# Patient Record
Sex: MALE | Race: WHITE | NOT HISPANIC OR LATINO | Employment: FULL TIME | URBAN - METROPOLITAN AREA
[De-identification: names, ages, dates, MRNs, and addresses within clinical notes are randomized per-mention and may not be internally consistent; named-entity substitution may affect disease eponyms.]

---

## 2021-03-10 DIAGNOSIS — Z23 ENCOUNTER FOR IMMUNIZATION: ICD-10-CM

## 2022-03-21 ENCOUNTER — EVALUATION (OUTPATIENT)
Dept: PHYSICAL THERAPY | Facility: REHABILITATION | Age: 62
End: 2022-03-21
Payer: COMMERCIAL

## 2022-03-21 DIAGNOSIS — S93.401D SPRAIN OF UNSPECIFIED LIGAMENT OF RIGHT ANKLE, SUBSEQUENT ENCOUNTER: Primary | ICD-10-CM

## 2022-03-21 PROCEDURE — 97161 PT EVAL LOW COMPLEX 20 MIN: CPT

## 2022-03-21 NOTE — LETTER
2022    Nuno Perrin DPM  [facility]    Patient: Andre Benjamin   YOB: 1960   Date of Visit: 3/21/2022     Encounter Diagnosis     ICD-10-CM    1  Sprain of unspecified ligament of right ankle, subsequent encounter  S93 401D        Dear Dr Destiny Allen: Thank you for your recent referral of Andre Benjamin  Please review the attached evaluation summary from [address] [place] [address] recent visit  Please verify that you agree with the plan of care by signing the attached order  If you have any questions or concerns, please do not hesitate to call  I sincerely appreciate the opportunity to share in the care of one of your patients and hope to have another opportunity to work with you in the near future  Sincerely,    Scott Crews, PT      Referring Provider:      I certify that I have read the below Plan of Care and certify the need for these services furnished under this plan of treatment while under my care  Nuno Perrin DPM  [address] [facility] [facility] [address]  Via Fax: [phone]          PT Evaluation     Today's date: 3/21/2022  Patient name: Andre Benjamin  : 1960  MRN: [id number]  Referring provider: Margarito Rubio DPM  Dx:   Encounter Diagnosis     ICD-10-CM    1  Sprain of unspecified ligament of right ankle, subsequent encounter  S93 401D                   Assessment  Assessment details: Pt, Andre Benjamin, is a 63 y o  male presenting to physical therapy on this date for an initial evaluation  Upon eval on this date, pt presented with minimally decreased R ankle ROM, decreased R ankle inversion and eversion strength, and overall impaired tolerance to functional activities  At this time, pt would benefit from skilled OP PT to work on deficits listed above and improve overall functional mobility with decreased reports of pain and compensation patterns   Thank you for this referral    Impairments: abnormal gait, activity intolerance, impaired balance, impaired physical strength, lacks appropriate home exercise program and pain with function    Goals  STG:    Pt will demonstrate 25% improvements in ROM in 4 weeks   Pt will be I with initial HEP to progress towards independence with exercise     LTG:   Pt will be I with IADLs without pain or restriction by d/c   Pt will improve R ankle ROM to [facility] by d/c for increased ease and safety with ADLs and overall functional mobility   Pt will improve R ankle strength by d/c for increased ease and safety with functional mobility and activities   Pt will be I with modified/updated HEP and demonstrate ability to complete with confidence and proper mechanics/form to safely be d/c from skilled OP PT and continue with exercises on their own     Plan  Patient would benefit from: PT eval and skilled physical therapy  Planned modality interventions: cryotherapy  Planned therapy interventions: balance/weight bearing training, body mechanics training, functional ROM exercises, home exercise program, therapeutic exercise, therapeutic activities, stretching, strengthening, patient education, neuromuscular re-education, manual therapy, joint mobilization and IADL retraining  Frequency: 2x week  Duration in visits: 8  Duration in weeks: 4  Treatment plan discussed with: patient        Subjective Evaluation    History of Present Illness  Date of onset: 3/5/2022  Mechanism of injury: Pt reports that he twisted his ankle March 5th  Pt reports that he was initially in a boot for one week and he has been wearing a brace since  Pt reports that he does not have any pain in his ankle or any n/t in his leg  Pt reports that prior to this injury he has no hx of injuries to his ankle  Pt reports that he does not work currently but he works at [employer] and is scheduled to return April 18th  He reports no issues with ADLs and no issues sleeping because of his ankle  Pt reports that he is no longer taking medications for pain  Pain  Current pain ratin  At worst pain ratin  Quality: dull ache, tight and discomfort  Relieving factors: ice and medications  Exacerbated by: turning the ankle   Progression: improved    Treatments  Previous treatment: immobilization  Patient Goals  Patient goals for therapy: decreased pain, improved balance, increased motion, increased strength, independence with ADLs/IADLs and return to sport/leisure activities  Patient goal: be able to walk without the brace         Objective     Active Range of Motion     Right Ankle/Foot   Dorsiflexion (ke): 10 degrees   Plantar flexion: 25 degrees   Inversion: 25 degrees   Eversion: 5 degrees     Passive Range of Motion     Right Ankle/Foot    Dorsiflexion (ke): 12 degrees   Plantar flexion: 35 degrees   Inversion: 30 degrees   Eversion: 10 degrees     Strength/Myotome Testing     Right Ankle/Foot   Dorsiflexion: 5  Plantar flexion: 5  Inversion: 4+  Eversion: 4+             Precautions: N/A      3/21            FOTO Perf            IE/RE IE              Manuals                                                                 Neuro Re-Ed             SLS             Tandem Stance             Biodex LOS, catch             Obstacle Course                                                    Ther Ex             Bike              Wall sits              HR             Step ups              Seated HS stretch             Standing gastroc stretch             Leg Press                                                                 Gait Training                                       Modalities

## 2022-03-21 NOTE — PROGRESS NOTES
PT Evaluation     Today's date: 3/21/2022  Patient name: Andre Benjamin  : 1960  MRN: [id number]  Referring provider: Chely Littlejohn DPM  Dx:   Encounter Diagnosis     ICD-10-CM    1  Sprain of unspecified ligament of right ankle, subsequent encounter  S93 401D                   Assessment  Assessment details: Pt, Andre Benjamin, is a 63 y o  male presenting to physical therapy on this date for an initial evaluation  Upon eval on this date, pt presented with minimally decreased R ankle ROM, decreased R ankle inversion and eversion strength, and overall impaired tolerance to functional activities  At this time, pt would benefit from skilled OP PT to work on deficits listed above and improve overall functional mobility with decreased reports of pain and compensation patterns   Thank you for this referral    Impairments: abnormal gait, activity intolerance, impaired balance, impaired physical strength, lacks appropriate home exercise program and pain with function    Goals  STG:    Pt will demonstrate 25% improvements in ROM in 4 weeks   Pt will be I with initial HEP to progress towards independence with exercise     LTG:   Pt will be I with IADLs without pain or restriction by d/c   Pt will improve R ankle ROM to [facility] by d/c for increased ease and safety with ADLs and overall functional mobility   Pt will improve R ankle strength by d/c for increased ease and safety with functional mobility and activities   Pt will be I with modified/updated HEP and demonstrate ability to complete with confidence and proper mechanics/form to safely be d/c from skilled OP PT and continue with exercises on their own     Plan  Patient would benefit from: PT eval and skilled physical therapy  Planned modality interventions: cryotherapy  Planned therapy interventions: balance/weight bearing training, body mechanics training, functional ROM exercises, home exercise program, therapeutic exercise, therapeutic activities, stretching, strengthening, patient education, neuromuscular re-education, manual therapy, joint mobilization and IADL retraining  Frequency: 2x week  Duration in visits: 8  Duration in weeks: 4  Treatment plan discussed with: patient        Subjective Evaluation    History of Present Illness  Date of onset: 3/5/2022  Mechanism of injury: Pt reports that he twisted his ankle   Pt reports that he was initially in a boot for one week and he has been wearing a brace since  Pt reports that he does not have any pain in his ankle or any n/t in his leg  Pt reports that prior to this injury he has no hx of injuries to his ankle  Pt reports that he does not work currently but he works at [employer] and is scheduled to return   He reports no issues with ADLs and no issues sleeping because of his ankle  Pt reports that he is no longer taking medications for pain     Pain  Current pain ratin  At worst pain ratin  Quality: dull ache, tight and discomfort  Relieving factors: ice and medications  Exacerbated by: turning the ankle   Progression: improved    Treatments  Previous treatment: immobilization  Patient Goals  Patient goals for therapy: decreased pain, improved balance, increased motion, increased strength, independence with ADLs/IADLs and return to sport/leisure activities  Patient goal: be able to walk without the brace         Objective     Active Range of Motion     Right Ankle/Foot   Dorsiflexion (ke): 10 degrees   Plantar flexion: 25 degrees   Inversion: 25 degrees   Eversion: 5 degrees     Passive Range of Motion     Right Ankle/Foot    Dorsiflexion (ke): 12 degrees   Plantar flexion: 35 degrees   Inversion: 30 degrees   Eversion: 10 degrees     Strength/Myotome Testing     Right Ankle/Foot   Dorsiflexion: 5  Plantar flexion: 5  Inversion: 4+  Eversion: 4+             Precautions: N/A      3/21            FOTO Perf            IE/RE IE              Manuals Neuro Re-Ed             SLS             Tandem Stance             Biodex LOS, catch             Obstacle Course                                                    Ther Ex             Bike              Wall sits              HR             Step ups              Seated HS stretch             Standing gastroc stretch             Leg Press                                                                 Gait Training                                       Modalities

## 2022-03-24 ENCOUNTER — OFFICE VISIT (OUTPATIENT)
Dept: PHYSICAL THERAPY | Facility: REHABILITATION | Age: 62
End: 2022-03-24
Payer: COMMERCIAL

## 2022-03-24 DIAGNOSIS — S93.401D SPRAIN OF UNSPECIFIED LIGAMENT OF RIGHT ANKLE, SUBSEQUENT ENCOUNTER: Primary | ICD-10-CM

## 2022-03-24 PROCEDURE — 97110 THERAPEUTIC EXERCISES: CPT

## 2022-03-24 PROCEDURE — 97112 NEUROMUSCULAR REEDUCATION: CPT

## 2022-03-24 NOTE — PROGRESS NOTES
Daily Note     Today's date: 3/24/2022  Patient name: Andre Benjamin  : 1960  MRN: [id number]  Referring provider: Margarito Rubio DPM  Dx:   Encounter Diagnosis     ICD-10-CM    1  Sprain of unspecified ligament of right ankle, subsequent encounter  S93 401D                   Subjective: Pt reports that his ankle is feeling good and he does not have any pain in it  Objective: See treatment diary below      Assessment: Pt tolerated treatment well  Patient completed his first follow up since his IE on this date with min cueing required throughout for form and mechanics with good carryover after instruction  Pt completed all exercises without brace on to promote increased ankle stability and strength during exercises  Pt was educated to complete all exercises to his tolerance and if he experienced any pain or discomfort to inform therapist immediately, pt verbalized understanding  Plan: Continue per plan of care  Progress treatment as tolerated         Precautions: N/A      3/21 3/24           FOTO Perf            IE/RE IE              Manuals                                                                 Neuro Re-Ed             SLS  2x 30"             Tandem Stance  Tandem walk 2 c UE support, 4 w/o UE support           Biodex LOS, catch  LOS x3, random x1           Obstacle Course               Rhomberg stance 2x30" on foam             Rockerboard x20 ea                        Ther Ex             Bike   x5 min           Wall sits   5" 2x10           HR             Step ups   Lat 0R x10           Seated HS stretch             Standing gastroc stretch             Leg Press   # 2x10             SL 44# 2x10                                                  Gait Training                                       Modalities

## 2022-03-28 ENCOUNTER — OFFICE VISIT (OUTPATIENT)
Dept: PHYSICAL THERAPY | Facility: REHABILITATION | Age: 62
End: 2022-03-28
Payer: COMMERCIAL

## 2022-03-28 DIAGNOSIS — S93.401D SPRAIN OF UNSPECIFIED LIGAMENT OF RIGHT ANKLE, SUBSEQUENT ENCOUNTER: Primary | ICD-10-CM

## 2022-03-28 PROCEDURE — 97110 THERAPEUTIC EXERCISES: CPT

## 2022-03-28 PROCEDURE — 97112 NEUROMUSCULAR REEDUCATION: CPT

## 2022-03-28 NOTE — PROGRESS NOTES
Daily Note     Today's date: 3/28/2022  Patient name: Andre Benjamin  : 1960  MRN: [id number]  Referring provider: Gerardo Kay DPM  Dx:   Encounter Diagnosis     ICD-10-CM    1  Sprain of unspecified ligament of right ankle, subsequent encounter  S93 401D                   Subjective: Pt reports that his ankle is feeling good  He reports that he has been doing his exercises at home and today he arrives to therapy not wearing his ankle brace  Objective: See treatment diary below      Assessment: Pt tolerated treatment well  Pt was able to complete all exercises without issue and was able to tolerate progression of balance exercises well  Patient demonstrated fatigue post treatment, exhibited good technique with therapeutic exercises and would benefit from continued PT  Plan: Continue per plan of care  Progress treatment as tolerated         Precautions: N/A      3/21 3/24 3/28          FOTO Perf            IE/RE IE              Manuals                          Neuro Re-Ed             SLS  2x 30"   2x30" on foam          Tandem Stance  Tandem walk 2 c UE support, 4 w/o UE support Tandem walk with no UE support 4 laps           Biodex LOS, catch  LOS x3, random x1 LOS x3, random x1          Obstacle Course   Stand on bosu 2x1 min            Rhomberg stance 2x30" on foam Rhomberg stance 2x30" on foam            Rockerboard x20 ea Rocker board x20 ea             Tandem stance on blue/green foam ball toss x15 ea           Ther Ex             Bike   TM x5 min TM x5 min          Wall sits   5" 2x10 5" 2x10          HR   x20 on foam          Step ups   Lat 0R x10 Lat 1R x10          Seated HS stretch             Standing gastroc stretch             Leg Press   # 2x10 # 2x10            SL 44# 2x10 SL 88# 2x10                       Modalities

## 2022-03-31 ENCOUNTER — OFFICE VISIT (OUTPATIENT)
Dept: PHYSICAL THERAPY | Facility: REHABILITATION | Age: 62
End: 2022-03-31
Payer: COMMERCIAL

## 2022-03-31 DIAGNOSIS — S93.401D SPRAIN OF UNSPECIFIED LIGAMENT OF RIGHT ANKLE, SUBSEQUENT ENCOUNTER: Primary | ICD-10-CM

## 2022-03-31 PROCEDURE — 97112 NEUROMUSCULAR REEDUCATION: CPT

## 2022-03-31 PROCEDURE — 97110 THERAPEUTIC EXERCISES: CPT

## 2022-03-31 NOTE — PROGRESS NOTES
Daily Note     Today's date: 3/31/2022  Patient name: Andre Benjamin  : 1960  MRN: [id number]  Referring provider: Naseem Blanca DPM  Dx:   Encounter Diagnosis     ICD-10-CM    1  Sprain of unspecified ligament of right ankle, subsequent encounter  S93 086D                   Subjective: Pt reports that his ankle is feeling good  He notes no complaints and states that he has been working on his leg press at home  Objective: See treatment diary below      Assessment: Pt tolerated treatment well  Pt required min cueing when walking on treadmill to make sure he was clearing his foot with each step and not dragging his toes  Pt continues to be most challenge with balance based exercises  Patient demonstrated fatigue post treatment, exhibited good technique with therapeutic exercises and would benefit from continued PT  Plan: Continue per plan of care  Progress treatment as tolerated         Precautions: N/A      3/21 3/24 3/28 3/31         FOTO Perf            IE/RE IE              Manuals                          Neuro Re-Ed             SLS  2x 30"   2x30" on foam 2x30" on foam         Tandem Stance  Tandem walk 2 c UE support, 4 w/o UE support Tandem walk with no UE support 4 laps  Tandem walk with no UE support 4 laps         Biodex LOS, catch  LOS x3, random x1 LOS x3, random x1 LOS L11 x2, Random L11 x1         Obstacle Course   Stand on bosu 2x1 min Stand on bosu x1 min           Rhomberg stance 2x30" on foam Rhomberg stance 2x30" on foam Mini squats on bosu x10           Rockerboard x20 ea Rocker board x20 ea Rocker board x20 ea            Tandem stance on blue/green foam ball toss x15 ea  Tandem stance on blue/green foam ball toss x15 ea          Ther Ex             Bike   TM x5 min TM x5 min TM x5 min         Wall sits   5" 2x10 5" 2x10 5" 2x10         HR   x20 on foam Slow march with pause 20'x2         Step ups   Lat 0R x10 Lat 1R x10 Lat 1R x15         Seated HS stretch    Side step on foam x4 laps         Standing gastroc stretch             Leg Press   # 2x10 # 2x10 DL #176 2x15           SL 44# 2x10 SL 88# 2x10 SL 88# 2x15                      Modalities

## 2022-04-04 ENCOUNTER — OFFICE VISIT (OUTPATIENT)
Dept: PHYSICAL THERAPY | Facility: REHABILITATION | Age: 62
End: 2022-04-04
Payer: COMMERCIAL

## 2022-04-04 DIAGNOSIS — S93.401D SPRAIN OF UNSPECIFIED LIGAMENT OF RIGHT ANKLE, SUBSEQUENT ENCOUNTER: Primary | ICD-10-CM

## 2022-04-04 PROCEDURE — 97112 NEUROMUSCULAR REEDUCATION: CPT

## 2022-04-04 PROCEDURE — 97110 THERAPEUTIC EXERCISES: CPT

## 2022-04-04 NOTE — PROGRESS NOTES
Daily Note     Today's date: 2022  Patient name: Andre Benjamin  : 1960  MRN: [id number]  Referring provider: Rufina Bettencourt DPM  Dx:   Encounter Diagnosis     ICD-10-CM    1  Sprain of unspecified ligament of right ankle, subsequent encounter  S93 215D                   Subjective: Pt reports that his ankle is feeling good  He reports he went fishing with his son this past weekend and there was one moment where his ankle turned and he noticed it but he did not have pain with it  Objective: See treatment diary below      Assessment: Pt tolerated treatment well  Pt was challenged with progression to ball throw while completing SLS with significant body sway noted as compensation to maintain balance  Patient demonstrated fatigue post treatment, exhibited good technique with therapeutic exercises and would benefit from continued PT  Plan: Continue per plan of care  Progress treatment as tolerated         Precautions: N/A      3/21 3/24 3/28 3/31 4/4        FOTO Perf    NV        IE/RE IE              Manuals                          Neuro Re-Ed             SLS  2x 30"   2x30" on foam 2x30" on foam SLS on foam 2x10 ball throws        Tandem Stance  Tandem walk 2 c UE support, 4 w/o UE support Tandem walk with no UE support 4 laps  Tandem walk with no UE support 4 laps Tandem walk on foam 4 laps        Biodex LOS, catch  LOS x3, random x1 LOS x3, random x1 LOS L11 x2, Random L11 x1 LOS L10 x2, Random L10 x1        Obstacle Course   Stand on bosu 2x1 min Stand on bosu x1 min           Rhomberg stance 2x30" on foam Rhomberg stance 2x30" on foam Mini squats on bosu x10 Mini squats on bosu 2x10          Rockerboard x20 ea Rocker board x20 ea Rocker board x20 ea Rocker board x30 ea           Tandem stance on blue/green foam ball toss x15 ea  Tandem stance on blue/green foam ball toss x15 ea  Tandem stance on blue/green foam ball toss 2x15 ea         Ther Ex             Bike   TM x5 min TM x5 min TM x5 min TM x5 min        Wall sits   5" 2x10 5" 2x10 5" 2x10 D/C        HR   x20 on foam Slow march with pause 20'x2 Slow march with pause 20'x4        Step ups   Lat 0R x10 Lat 1R x10 Lat 1R x15         Seated HS stretch    Side step on foam x4 laps Side step on foam x4 laps        Standing gastroc stretch             Leg Press   # 2x10 # 2x10 DL #[id number] 2x15 DL #198 2x15          SL 44# 2x10 SL 88# 2x10 SL 88# 2x15 SL 88# 2x15                      Modalities

## 2022-04-07 ENCOUNTER — OFFICE VISIT (OUTPATIENT)
Dept: PHYSICAL THERAPY | Facility: REHABILITATION | Age: 62
End: 2022-04-07
Payer: COMMERCIAL

## 2022-04-07 DIAGNOSIS — S93.401D SPRAIN OF UNSPECIFIED LIGAMENT OF RIGHT ANKLE, SUBSEQUENT ENCOUNTER: Primary | ICD-10-CM

## 2022-04-07 PROCEDURE — 97110 THERAPEUTIC EXERCISES: CPT

## 2022-04-07 PROCEDURE — 97112 NEUROMUSCULAR REEDUCATION: CPT

## 2022-04-07 NOTE — PROGRESS NOTES
Daily Note     Today's date: 2022  Patient name: Andre Benjamin  : 1960  MRN: [id number]  Referring provider: Libia Lockwood DPM  Dx:   Encounter Diagnosis     ICD-10-CM    1  Sprain of unspecified ligament of right ankle, subsequent encounter  S93 401D                   Subjective: Pt reports that his ankle is doing well, notes no issues  He reports that he did try a bit of jogging yesterday with his dogs but his ankle just didn't feel right so he stopped  Objective: See treatment diary below      Assessment: Pt tolerated treatment well  Pt demonstrates most challenge with single limb stability exercises especially on uneven surfaces with observable postural sway to maintain balance as well as L foot tapping on firm ground to maintain balance  Pt was challenged with addition of airplanes on this date again demonstrating significant challenge with single limb stability and compensation patterns previously listed  Patient demonstrated fatigue post treatment, exhibited good technique with therapeutic exercises and would benefit from continued PT  Plan: Continue per plan of care  Progress treatment as tolerated         Precautions: N/A      3/21 3/24 3/28 3/31 4/4 4/7       FOTO Perf    NV        IE/RE IE              Manuals                          Neuro Re-Ed             SLS  2x 30"   2x30" on foam 2x30" on foam SLS on foam 2x10 ball throws SLS on foam 2x10 ball throws       Tandem Stance  Tandem walk 2 c UE support, 4 w/o UE support Tandem walk with no UE support 4 laps  Tandem walk with no UE support 4 laps Tandem walk on foam 4 laps        Biodex LOS, catch  LOS x3, random x1 LOS x3, random x1 LOS L11 x2, Random L11 x1 LOS L10 x2, Random L10 x1 LOS L10 x3 50%       Obstacle Course   Stand on bosu 2x1 min Stand on bosu x1 min           Rhomberg stance 2x30" on foam Rhomberg stance 2x30" on foam Mini squats on bosu x10 Mini squats on bosu 2x10 Mini squats on bosu 2x10         Rockerboard x20 ea Rocker board x20 ea Rocker board x20 ea Rocker board x30 ea Rocker board x30 ea          Tandem stance on blue/green foam ball toss x15 ea  Tandem stance on blue/green foam ball toss x15 ea  Tandem stance on blue/green foam ball toss 2x15 ea  Tandem stance on blue/green foam ball toss 2x15 ea        Ther Ex             Bike   TM x5 min TM x5 min TM x5 min TM x5 min TM x5 min       Wall sits   5" 2x10 5" 2x10 5" 2x10 D/C Airplanes x10 ea       HR   x20 on foam Slow march with pause 20'x2 Slow march with pause 20'x4 Slow march with pause 20'x4       Step ups   Lat 0R x10 Lat 1R x10 Lat 1R x15  Lat 1R x20       Seated HS stretch    Side step on foam x4 laps Side step on foam x4 laps        Standing gastroc stretch             Leg Press   # 2x10 # 2x10 DL #176 2x15 DL #198 2x15 DL #198 2x15         SL 44# 2x10 SL 88# 2x10 SL 88# 2x15 SL 88# 2x15  SL 88# 2x15                    Modalities

## 2022-04-11 ENCOUNTER — OFFICE VISIT (OUTPATIENT)
Dept: PHYSICAL THERAPY | Facility: REHABILITATION | Age: 62
End: 2022-04-11
Payer: COMMERCIAL

## 2022-04-11 DIAGNOSIS — S93.401D SPRAIN OF UNSPECIFIED LIGAMENT OF RIGHT ANKLE, SUBSEQUENT ENCOUNTER: Primary | ICD-10-CM

## 2022-04-11 PROCEDURE — 97112 NEUROMUSCULAR REEDUCATION: CPT

## 2022-04-11 PROCEDURE — 97110 THERAPEUTIC EXERCISES: CPT

## 2022-04-11 NOTE — PROGRESS NOTES
Daily Note     Today's date: 2022  Patient name: Andre Benjamin  : 1960  MRN: [id number]  Referring provider: Libia Lockwood DPM  Dx:   Encounter Diagnosis     ICD-10-CM    1  Sprain of unspecified ligament of right ankle, subsequent encounter  S93 772D                   Subjective: Pt reports that he is feeling good and had a follow up with his doctor this morning and is cleared to return to work next week  Objective: See treatment diary below      Assessment: Pt tolerated treatment well  Pt noted mild fatigue with increasingly difficult balance exercises but was able to complete all without reports of pain or discomfort in his ankle  Patient demonstrated fatigue post treatment, exhibited good technique with therapeutic exercises and would benefit from continued PT  Plan: RE to be completed NV        Precautions: N/A      3/21 3/24 3/28 3/31 4/4 4/7 4/11      FOTO Perf    NV        IE/RE IE              Manuals                          Neuro Re-Ed             SLS  2x 30"   2x30" on foam 2x30" on foam SLS on foam 2x10 ball throws SLS on foam 2x10 ball throws SLS on foam 2x15 ball throws      Tandem Stance  Tandem walk 2 c UE support, 4 w/o UE support Tandem walk with no UE support 4 laps  Tandem walk with no UE support 4 laps Tandem walk on foam 4 laps  Tandem walk on foam 4 laps      Biodex LOS, catch  LOS x3, random x1 LOS x3, random x1 LOS L11 x2, Random L11 x1 LOS L10 x2, Random L10 x1 LOS L10 x3 50% LOS L x3 29%      Obstacle Course   Stand on bosu 2x1 min Stand on bosu x1 min           Rhomberg stance 2x30" on foam Rhomberg stance 2x30" on foam Mini squats on bosu x10 Mini squats on bosu 2x10 Mini squats on bosu 2x10 Mini squats on bosu 2x15        Rockerboard x20 ea Rocker board x20 ea Rocker board x20 ea Rocker board x30 ea Rocker board x30 ea Rocker board x30 ea         Tandem stance on blue/green foam ball toss x15 ea  Tandem stance on blue/green foam ball toss x15 ea  Tandem stance on blue/green foam ball toss 2x15 ea  Tandem stance on blue/green foam ball toss 2x15 ea  Tandem stance on blue/green foam ball toss 2x15 ea      Ther Ex             Bike   TM x5 min TM x5 min TM x5 min TM x5 min TM x5 min TM x5 min      Wall sits   5" 2x10 5" 2x10 5" 2x10 D/C Airplanes x10 ea Airplanes x10 ea      HR   x20 on foam Slow march with pause 20'x2 Slow march with pause 20'x4 Slow march with pause 20'x4 Slow march with pause 20'x4      Step ups   Lat 0R x10 Lat 1R x10 Lat 1R x15  Lat 1R x20       Seated HS stretch    Side step on foam x4 laps Side step on foam x4 laps  Side step on foam x4 laps      Standing gastroc stretch             Leg Press   # 2x10 # 2x10 DL #176 2x15 DL #198 2x15 DL #198 2x15 DL #198 2x15        SL 44# 2x10 SL 88# 2x10 SL 88# 2x15 SL 88# 2x15  SL 88# 2x15 SL 88# 2x15                   Modalities

## 2022-04-14 ENCOUNTER — EVALUATION (OUTPATIENT)
Dept: PHYSICAL THERAPY | Facility: REHABILITATION | Age: 62
End: 2022-04-14
Payer: COMMERCIAL

## 2022-04-14 DIAGNOSIS — S93.401D SPRAIN OF UNSPECIFIED LIGAMENT OF RIGHT ANKLE, SUBSEQUENT ENCOUNTER: Primary | ICD-10-CM

## 2022-04-14 PROCEDURE — 97112 NEUROMUSCULAR REEDUCATION: CPT

## 2022-04-14 PROCEDURE — 97164 PT RE-EVAL EST PLAN CARE: CPT

## 2022-04-14 NOTE — PROGRESS NOTES
PT Re-evaluation     Today's date: 2022  Patient name: Andre Benjamin  : 1960  MRN: [id number]  Referring provider: Yamile Delgado DPM  Dx:   Encounter Diagnosis     ICD-10-CM    1  Sprain of unspecified ligament of right ankle, subsequent encounter  [id number]                 Assessment  Assessment details:   Upon RE on this date, pt had demonstrated improvements in both active and passive ROM as well as 5/5 R ankle strength  Since beginning therapy pt has been compliant with HEP and demonstrates overall good understanding of exercises  Pt has met all therapeutic goals  At this time, pt will be d/c to HEP  HEP was reviewed with pt and all questions were answered  Pt was educated to contact office with any questions or concerns, pt verbalized understanding  Goals  STG:    Pt will demonstrate 25% improvements in ROM in 4 weeks MET  Pt will be I with initial HEP to progress towards independence with exercise MET    LTG:   Pt will be I with IADLs without pain or restriction by d/c MET   Pt will improve R ankle ROM to [facility] by d/c for increased ease and safety with ADLs and overall functional mobility MET  Pt will improve R ankle strength by d/c for increased ease and safety with functional mobility and activities MET  Pt will be I with modified/updated HEP and demonstrate ability to complete with confidence and proper mechanics/form to safely be d/c from skilled OP PT and continue with exercises on their own  MET       Plan  Pt will be d/c to HEP        Subjective Evaluation     History of Present Illness  Date of onset: 3/5/2022  Mechanism of injury: Pt reports that he twisted his ankle   Pt reports that he was initially in a boot for one week and he has been wearing a brace since  Pt reports that he does not have any pain in his ankle or any n/t in his leg  Pt reports that prior to this injury he has no hx of injuries to his ankle   Pt reports that he does not work currently but he works at [employer] and is scheduled to return   He reports no issues with ADLs and no issues sleeping because of his ankle  Pt reports that he is no longer taking medications for pain  22  Pt reports that since starting therapy his ankle is feeling better and all activities are easier for him  Pt reports that he is scheduled to go back to work on Monday  Pt reports that he has been compliant with all of his exercises at home         Pain  Current pain ratin  At worst pain ratin  Quality: dull ache, tight and discomfort  Relieving factors: ice and medications  Exacerbated by: turning the ankle   Progression: improved    22  Current: 0  At worst: Occasional 3-4      Treatments  Previous treatment: immobilization  Patient Goals  Patient goals for therapy: decreased pain, improved balance, increased motion, increased strength, independence with ADLs/IADLs and return to sport/leisure activities  Patient goal: be able to walk without the brace            Objective      Active Range of Motion      Right Ankle/Foot   Dorsiflexion (ke): 10 degrees   Plantar flexion: 25 degrees   Inversion: 25 degrees   Eversion: 5 degrees     22  Dorsiflexion (ke): 15 degrees   Plantar flexion: 35 degrees   Inversion: 30 degrees   Eversion: 14 degrees     Passive Range of Motion      Right Ankle/Foot    Dorsiflexion (ke): 12 degrees   Plantar flexion: 35 degrees   Inversion: 30 degrees   Eversion: 10 degrees     22  Dorsiflexion (ke):  20 degrees   Plantar flexion: 40 degrees   Inversion: 40 degrees   Eversion: 20 degrees     Strength/Myotome Testing      Right Ankle/Foot   Dorsiflexion: 5  Plantar flexion: 5  Inversion: 4+  Eversion: 4+    22  Dorsiflexion: 5  Plantar flexion: 5  Inversion: 5  Eversion: 5       Precautions: N/A      3/21 3/24 3/28 3/31 4/4 4/7 4/11 4/14     FOTO Perf    NV   Perf     IE/RE IE       RE       Manuals                          Neuro Re-Ed             SLS  2x 30"   2x30" on foam 2x30" on foam SLS on foam 2x10 ball throws SLS on foam 2x10 ball throws SLS on foam 2x15 ball throws      Tandem Stance  Tandem walk 2 c UE support, 4 w/o UE support Tandem walk with no UE support 4 laps  Tandem walk with no UE support 4 laps Tandem walk on foam 4 laps  Tandem walk on foam 4 laps      Biodex LOS, catch  LOS x3, random x1 LOS x3, random x1 LOS L11 x2, Random L11 x1 LOS L10 x2, Random L10 x1 LOS L10 x3 50% LOS L x3 29%      Obstacle Course   Stand on bosu 2x1 min Stand on bosu x1 min           Rhomberg stance 2x30" on foam Rhomberg stance 2x30" on foam Mini squats on bosu x10 Mini squats on bosu 2x10 Mini squats on bosu 2x10 Mini squats on bosu 2x15 Mini squats on bosu 2x15       Rockerboard x20 ea Rocker board x20 ea Rocker board x20 ea Rocker board x30 ea Rocker board x30 ea Rocker board x30 ea         Tandem stance on blue/green foam ball toss x15 ea  Tandem stance on blue/green foam ball toss x15 ea  Tandem stance on blue/green foam ball toss 2x15 ea  Tandem stance on blue/green foam ball toss 2x15 ea  Tandem stance on blue/green foam ball toss 2x15 ea      Ther Ex             Bike   TM x5 min TM x5 min TM x5 min TM x5 min TM x5 min TM x5 min TM x5 min     Wall sits   5" 2x10 5" 2x10 5" 2x10 D/C Airplanes x10 ea Airplanes x10 ea      HR   x20 on foam Slow march with pause 20'x2 Slow march with pause 20'x4 Slow march with pause 20'x4 Slow march with pause 20'x4      Step ups   Lat 0R x10 Lat 1R x10 Lat 1R x15  Lat 1R x20       Seated HS stretch    Side step on foam x4 laps Side step on foam x4 laps  Side step on foam x4 laps      Standing gastroc stretch             Leg Press   # 2x10 # 2x10 DL #[id number] 2x15 DL #198 2x15 DL #198 2x15 DL #198 2x15        SL 44# 2x10 SL 88# 2x10 SL 88# 2x15 SL 88# 2x15  SL 88# 2x15 SL 88# 2x15                   Modalities